# Patient Record
Sex: FEMALE
[De-identification: names, ages, dates, MRNs, and addresses within clinical notes are randomized per-mention and may not be internally consistent; named-entity substitution may affect disease eponyms.]

---

## 2022-11-16 ENCOUNTER — HOSPITAL ENCOUNTER (OUTPATIENT)
Dept: PHYSICAL THERAPY | Facility: CLINIC | Age: 62
Setting detail: THERAPIES SERIES
Discharge: HOME | End: 2022-11-16
Payer: COMMERCIAL

## 2022-11-16 PROCEDURE — 97161 PT EVAL LOW COMPLEX 20 MIN: CPT

## 2022-11-16 PROCEDURE — 97530 THERAPEUTIC ACTIVITIES: CPT

## 2022-11-16 NOTE — CONSULTS
[] [facility]) - [facility] &  Therapy  [address]  [phone]  F: [phone] [] [address]  [facility]   [address]  P: [phone]  F: [phone] [x] [address] &  Therapy  [address]  P: [phone]  F: [phone] [] [address]  P: [phone]  F: [phone] [] [address]  [facility]   [address]   [place]: [phone]  F: [phone]      Physical Therapy General Evaluation/Pelvic Floor     Date:  2022  Patient: Kin Dennis  : 1960  MRN: [id number]  Physician: Marivel Velazquez MD    Insurance: [facility] ( visits approved)   Medical Diagnosis:   Diagnosis   R35.0 (ICD-10-CM) - Frequency of micturition         Rehab Codes: R27.8, M95.5, M62.81, R39.15, R35.0, R35.1  Onset Date:                         Next 's appt: PRN    Subjective:   CC/HPI:  Patient reports to physical therapy this date with increased urinary urgency and frequency. Patient reports that about 1 year ago she began to notice an increase in urinary frequency. States that she also feels as if she is unable to empty her bladder all the way as well. Patient states that symptoms began to progress over the last year and she decided to see a urologist. Patient saw Dr. Ivy Fink  for the first time on 22, where US was performed of bladder. US did not show any bladder retention at that time. Patient was prescribed medication for overactive bladder after the first appointment with no decrease in symptoms. Patient was then prescribed new medication after last visit with urologist that patient states has been helpful, however patient has stopped taking it due to side affects.         Stress Incontinence: [] Yes   [x] No      Stress incontinence occurs with:  Amount of leakage:     Urge Incontinence: [] Yes   [x] No  Urge incontinence occurs when:  Amount of Leakage:    Urinary Urgency/Frequency: [x] Yes   []  No     How many times do you urinate per day: 15-16x/day           Nocturia: [x] Yes   [] No                How many times do you urinate per night: 1-2x/night     Pad Use:  [] Yes   [x] No              How many pads do you use per day:       Pelvic Pain: [] Yes  [x] No     Dyspareunia: [] Yes   [x] No       Where does pain occur:       Dysuria: [] Yes   [x] No      Other: SIJ and low back pain     Constipation:  [] Yes   [x]  No    Diarrhea: [] Yes   [x]  No    Prolapse: [] Yes  [x]  No     Feels prolapse with:    Pregnancies: [] Yes  [x]  No  [] Vaginal  []      How many:   Complications:    Menstrual: Menopause     Red flags: Denies any abnormal vaginal bleeding, blood in urine, blood in stool, active infections     Surgeries: Laparoscopic surgery (96'), Partial Hysterectomy (06')     Hx of verbal, physical or sexual abuse: []  Yes  [x] No         PMHx: [x] Unremarkable [] Diabetes [] HTN [] Pacemaker   [] MI/Heart Problems [] Cancer [] Arthritis [] Other:              [x] Refer to full medical chart  In EPIC       Comorbidities:   [] Obesity [] Dialysis  [] N/A   [] Asthma/COPD [] Dementia [] Other:    [] Stroke [] Sleep apnea [] Other:   [] Vascular disease [] Rheumatic disease [] Other:     Tests:  [] X-Ray: [] MRI:  [] Other:    Medications: [x] Refer to full medical record [] None [] Other:  Allergies:       [x] Refer to full medical record [] None [] Other:    Function:  Hand Dominance  [] Right  [] Left  Job Status []  Normal duty   [] Light duty   [] Off due to condition    []  Retired   [x] Not employed   [] Disability  [] Other:  []  Return to work   Work activities/duties        Pain:  [] Yes  [x] No Location: N/A Pain Rating: (0-10 scale) 0/10  Pain altered Tx:  [] Yes  [] No  Action:    Symptoms:  [] Improving [] Worsening [] Same  Better:  [] AM    [] PM    [] Sit    [] Rise/Sit    []Stand    [] Walk    [] Lying    [] Other:  Worse: [] AM    [] PM    [] Sit    [] Rise/Sit    []Stand    [] Walk    [] Lying    [] Bend                      [] Valsalva    [] Other:  Sleep: [] OK    [] Disturbed    Objective:      ROM  ° A/P STRENGTH  ROM    Left Right Left Right Cervical    Shoulder Flex     Flexion    Abd     Extension    Elbow Flex     Rotation L R   Ext     Sidebend L R   Wrist Flex     Retraction    Ext     Lumbar    Hand      Flexion 100%   Hip Flex   3 3 Extension 50%   Ext   2 2 Rotation L 50% R  50%   Abd   3 3 Sidebend L  100% R  100%   Knee Flex   4 4      Ext   5 5      Ankle DF   5 5      PF   5 5        OBSERVATION No Deficit Deficit Not Tested Comments   Posture           Forward Head []  [x]  []      Rounded Shoulders []  [x]  []      Kyphosis []  []  []      Lordosis []  [x]  []  Increased    Lateral Shift []  []  []      Palpation [x]  []  []  TTP (B) piriformis    Diastasis Recti []  [x]  []  Little to no activation of abdominal mm upon palpation, core strength: 2/10   Pelvic rotation []  [x]  []   (+) long sit test for (R) anterior innominate torsion   Carnett's Test []  [x]  []  (+) for MSK pain   Perineal Descent at rest []  []  [x]  Not completed due to nature of patient's symptoms   Voluntary Perineal Body Elevation  []  []  [x]    \"   Perineal Body Relaxation []  []  [x]  \"   Perineal Body Movement with Sustained increase in IAP []  []  [x]  \"   Perineal Body Movement with Rapid Increase in IAP []  []  [x]  \"   Laycock PERFECT Scale []  []  [x]  \"   Prolapse []  []  [x]  \"   Sensation [x]  []  []          Comments: Patient verbalizes consent for all external and internal examination techniques this date. Patient was aware that they could stop examination at any point during assessment with good understanding verbalized by patient.  Patient was draped properly, gloves were used and universal precautions were used. Patient offered chaperone with patient: [] Accepting chaperone and chaperone present during entire internal examination                [x] Denying need for chaperone at this time     Assessment:  Patient is a 60year old female who presents to physical therapy with urinary frequency and urgency. Patient demonstrates impairments in lumbar AROM, pelvic rotation, (B) LE strength, core strength, urinary urgency, urinary frequency. These impairments affect the patient's ability to complete ADLs, household related tasks and participate in social activities without increased discomfort. Patient would benefit from skilled physical therapy in order to improve impairments and improve overall quality of life. Educated patient on evaluation findings and poc. Patient verbalized understanding of all education at this time. Problems:       Patient would benefit from skilled physical therapy services in order to:   [] ? Pain:    [] ? Pain:  [x] ? ROM:    [x] ? ROM:  [x] ? Strength:   [x] ? Strength:  [x] ? Function:   [x] ? Function:  [] Other:    [] Other:       STG: (to be met in 4 treatments)  Patient will improve lumbar AROM to 100% within all planes in order to increase ease of performing ADLs  ? Strength: Patient will demonstrate proper TA/PF activation in supine with correct breathing in order to improve core/pelvic floor stability   ?  Function: Patient will report reduced urinary urgency with use reduction of bladder irritants and use of urge suppression techniques   Independent with Home Exercise Programs  LTG: (to be met in 8 treatments)  Patient will improve BINH-6 to 18.17 in order to indicate improved overall quality of life  Patient will improve core strength to 3/10 in order to reduce tension to low back and improve support to pelvic organs and pelvic floor   Patient will improve (B) LE strength to 4+/5 in order to improve stability to pelvis and   Patient will report reduced urinary frequency to 8x/day in order to improve bladder habits and quality of life  Patient will report reduced nocturia to 0x/night in order to improve sleep quality     Rehab Potential:  [x] Good  [] Fair  [] Poor   Suggested Professional Referral:  [x] No  [] Yes:  Barriers to Goal Achievement:  [x] No  [] Yes:  Domestic Concerns:  [x] No  [] Yes:    Pt. Education:  [x] Plans/Goals, Risks/Benefits discussed  [x] Home exercise program    Method of Education: [x] Verbal  [x] Demo  [] Written  Comprehension of Education:  [x] Verbalizes understanding. [x] Demonstrates understanding. [x] Needs Review. [] Demonstrates/verbalizes understanding of HEP/Ed previously given.     Treatment Plan:  [x] Therapeutic Exercise   94904  [] Iontophoresis: 4 mg/mL Dexamethasone Sodium Phosphate  mAmin  77319   [x] Therapeutic Activity  72578 [] Vasopneumatic cold with compression  15163    [] Gait Training   40375 [] Ultrasound   92707   [x] Neuromuscular Re-education  40275 [] Electrical Stimulation Unattended  79346   [x] Manual Therapy  35447 [] Electrical Stimulation Attended  30116   [x] Instruction in HEP  [] Lumbar/Cervical Traction  68629   [] Aquatic Therapy   94299 [x] Cold/hotpack    [] Massage   13430      [] Dry Needling, 1 or 2 muscles  67412   [] Biofeedback, first 15 minutes   34608  [] Biofeedback, additional 15 minutes   92705 [] Dry Needling, 3 or more muscles  82560     Frequency:  1 x/week for 8 visits    Todays Treatment:  Modalities:   Precautions:  Exercises:   Exercise Reps/ Time Weight/ Level Comments   Education 13'  -Educated patient on urinary urgency and frequency  -Educated patient on pelvic anatomy and pelvic floor function  -Educated patient on bladder irritants and provided patient with handout that lists bladder irritants  -Educated patient on urge suppression techniques and voiding schedule in order to educe urinary urgency and frequency  -Provided patient with bladder diary and discussed purpose and how to use diary                                 Specific Instructions for next treatment: Review urge suppression and voiding schedule, review bladder diary, initiate (B) hip and core strength      Evaluation Complexity:  History (Personal factors, comorbidities) [x] 0 [] 1-2 [] 3+   Exam (limitations, restrictions) [x] 1-2 [] 3 [] 4+   Clinical presentation (progression) [x] Stable [] Evolving  [] Unstable   Decision Making [x] Low [] Moderate [] High    [x] Low Complexity [] Moderate Complexity [] High Complexity       Treatment Charges: Mins Units   [x] Evaluation       [x]  Low       []  Moderate       []  High 30 1   []  Modalities:     []  Ther Exercise     []  Manual Therapy     [x]  Ther Activities 15 1   []  Aquatics     [] Vasocompression     []  Other       TOTAL TREATMENT TIME: 45 min      Time in: 1:20 pm      Time out: 2:05 pm       Electronically signed by: Kayce Lund PT      Physician Signature:________________________________Date:__________________  By signing above or cosigning this note, I have reviewed this plan of care and certify a need for medically necessary rehabilitation services.      *PLEASE SIGN ABOVE AND FAX BACK ALL PAGES*

## 2022-11-30 ENCOUNTER — APPOINTMENT (OUTPATIENT)
Dept: PHYSICAL THERAPY | Facility: CLINIC | Age: 62
End: 2022-11-30
Payer: COMMERCIAL